# Patient Record
Sex: MALE | Race: WHITE | URBAN - METROPOLITAN AREA
[De-identification: names, ages, dates, MRNs, and addresses within clinical notes are randomized per-mention and may not be internally consistent; named-entity substitution may affect disease eponyms.]

---

## 2022-03-02 ENCOUNTER — OFFICE VISIT (OUTPATIENT)
Dept: URBAN - METROPOLITAN AREA CLINIC 88 | Facility: CLINIC | Age: 22
End: 2022-03-02
Payer: COMMERCIAL

## 2022-03-02 DIAGNOSIS — H50.51 ESOPHORIA: ICD-10-CM

## 2022-03-02 DIAGNOSIS — H04.123 DRY EYE SYNDROME OF BILATERAL LACRIMAL GLANDS: ICD-10-CM

## 2022-03-02 DIAGNOSIS — H53.2 DIPLOPIA: ICD-10-CM

## 2022-03-02 DIAGNOSIS — H52.13 MYOPIA, BILATERAL: Primary | ICD-10-CM

## 2022-03-02 PROCEDURE — 92004 COMPRE OPH EXAM NEW PT 1/>: CPT | Performed by: OPTOMETRIST

## 2022-03-02 ASSESSMENT — VISUAL ACUITY
OD: 20/20
OS: 20/20

## 2022-03-02 ASSESSMENT — INTRAOCULAR PRESSURE
OD: 13
OS: 13

## 2022-03-02 NOTE — IMPRESSION/PLAN
Impression: Myopia, bilateral: H52.13. Plan: Reviewed refractive prescription in detail with patient and optional for pt to order glasses at this time due to low rx. Sandra Chanel to release spectacle prescription.

## 2022-03-02 NOTE — IMPRESSION/PLAN
Impression: Diplopia: H53.2. Plan: Mild Esophoria posture at near detected on exam.   Discussed with patient the diplopia is likely due to him being unable to diverge eyes in dx when muscles are fatigued. Pt had concussion in high school ([id number]) and wanted to know if that could have caused this problem. Patient stated he never had an MRI done when concussion happened. Recommend pt RTC in 1-2 weeks for VF 30-2 and follow-up c results 1-2 weeks after that. Also recommended pt consider seeing PCP for follow up and possible MRI since it was never done after concussion.

## 2022-04-01 ENCOUNTER — TESTING ONLY (OUTPATIENT)
Dept: URBAN - METROPOLITAN AREA CLINIC 88 | Facility: CLINIC | Age: 22
End: 2022-04-01
Payer: COMMERCIAL

## 2022-04-01 PROCEDURE — 92083 EXTENDED VISUAL FIELD XM: CPT | Performed by: OPTOMETRIST

## 2022-05-04 ENCOUNTER — OFFICE VISIT (OUTPATIENT)
Dept: URBAN - METROPOLITAN AREA CLINIC 88 | Facility: CLINIC | Age: 22
End: 2022-05-04
Payer: COMMERCIAL

## 2022-05-04 DIAGNOSIS — H52.533 BILATERAL SPASMS OF ACCOMMODATION: ICD-10-CM

## 2022-05-04 DIAGNOSIS — H53.2 DIPLOPIA: Primary | ICD-10-CM

## 2022-05-04 DIAGNOSIS — H50.51 ESOPHORIA: ICD-10-CM

## 2022-05-04 PROCEDURE — 99213 OFFICE O/P EST LOW 20 MIN: CPT | Performed by: OPTOMETRIST

## 2022-05-04 ASSESSMENT — INTRAOCULAR PRESSURE
OD: 14
OS: 12

## 2022-05-04 NOTE — IMPRESSION/PLAN
Impression: Diplopia: H53.2. Plan: Mild Esophoria posture at near. Other binocular measurements today: NRA +2.00; near phoria 6 eso, .5 BD OS; vergences: BI 4/17/14; [place] 12/18/6. Discussed with patient the diplopia is likely due to him being unable to diverge eyes in dx when muscles are fatigued (see further discussion on plan #2). Pt had concussion in high school ([id number]) and wanted to know if that could have caused this problem. Patient stated he never had an MRI done when concussion happened. VF 30-2 done on 04/01/22, no neurological defect found.

## 2022-05-04 NOTE — IMPRESSION/PLAN
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 Plan: BCC measured +1.50 (net) and PRA was -1.25 (net). Explained pt using convergence to accommodate which is causing hysteresis (blur when looking up from reading). Pt over-converges for too long on near tasks, which is also causing intermittent diplopia in distance. Trial of small ADD or prism in phoropter, but pt felt it blurred vision. Ed pt only treatment that would help him would be vision therapy, however, there are no therapists in this vicinity. Ed pt on near/far facility exercise to try at home. 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   If he is still having this problem, he will seek a VT specialist.